# Patient Record
Sex: FEMALE | Race: WHITE
[De-identification: names, ages, dates, MRNs, and addresses within clinical notes are randomized per-mention and may not be internally consistent; named-entity substitution may affect disease eponyms.]

---
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
## 2020-01-23 ENCOUNTER — HOSPITAL ENCOUNTER (EMERGENCY)
Dept: HOSPITAL 61 - ER | Age: 20
Discharge: HOME | End: 2020-01-23
Payer: SELF-PAY

## 2020-01-23 VITALS — WEIGHT: 165.35 LBS | HEIGHT: 65 IN | BODY MASS INDEX: 27.55 KG/M2

## 2020-01-23 VITALS — SYSTOLIC BLOOD PRESSURE: 117 MMHG | DIASTOLIC BLOOD PRESSURE: 62 MMHG

## 2020-01-23 DIAGNOSIS — R10.2: ICD-10-CM

## 2020-01-23 DIAGNOSIS — N94.6: Primary | ICD-10-CM

## 2020-01-23 LAB
APTT PPP: YELLOW S
BACTERIA #/AREA URNS HPF: (no result) /HPF
BILIRUB UR QL STRIP: NEGATIVE
FIBRINOGEN PPP-MCNC: CLEAR MG/DL
NITRITE UR QL STRIP: NEGATIVE
PH UR STRIP: 6 [PH]
PROT UR STRIP-MCNC: NEGATIVE MG/DL
RBC #/AREA URNS HPF: 0 /HPF (ref 0–2)
SQUAMOUS #/AREA URNS LPF: (no result) /LPF
UROBILINOGEN UR-MCNC: 0.2 MG/DL
WBC #/AREA URNS HPF: 0 /HPF (ref 0–4)

## 2020-01-23 PROCEDURE — 81025 URINE PREGNANCY TEST: CPT

## 2020-01-23 PROCEDURE — 99285 EMERGENCY DEPT VISIT HI MDM: CPT

## 2020-01-23 PROCEDURE — 76856 US EXAM PELVIC COMPLETE: CPT

## 2020-01-23 PROCEDURE — 81001 URINALYSIS AUTO W/SCOPE: CPT

## 2020-01-23 NOTE — PHYS DOC
Past Medical History


Past Medical History:  No Pertinent History


Past Surgical History:  No Surgical History


Alcohol Use:  None





Adult General


Chief Complaint


Chief Complaint:  ABDOMINAL PAIN





HPI


HPI





Patient is a 19  year old female with no significant medical history who 

presents to the ED today complaining of 2 out of 10 pelvic pain bilaterally that

began 4 days ago. Patient reports the pain is cramping and intermittent. Denies 

any chance she is pregnant. She states she is currently on her menstrual cycle 

and is not bleeding more than normal. She reports she has been taking ibuprofen 

with relief. She reports she's never had abdominal cramping with her menstrual 

cycles.





Review of Systems


Review of Systems





Constitutional: Denies fever or chills []


Eyes: Denies change in visual acuity, redness, or eye pain []


HENT: Denies nasal congestion or sore throat []


Respiratory: Denies cough or shortness of breath []


Cardiovascular: No additional information not addressed in HPI []


GI: Reports abdominal cramping, denies nausea, vomiting, bloody stools or 

diarrhea []


: Denies dysuria or hematuria []


Musculoskeletal: Denies back pain or joint pain []


Integument: Denies rash or skin lesions []


Neurologic: Denies headache, focal weakness or sensory changes []








All other systems were reviewed and found to be within normal limits, except as 

documented in this note.





Allergies


Allergies





Allergies








Coded Allergies Type Severity Reaction Last Updated Verified


 


  No Known Drug Allergies    1/23/20 No











Physical Exam


Physical Exam





Constitutional: Well developed, well nourished, no acute distress, non-toxic 

appearance. []


HENT: Normocephalic, atraumatic, bilateral external ears normal, oropharynx 

moist, no oral exudates, nose normal. []


Eyes: PERRLA, EOMI, conjunctiva normal, no discharge. [] 


Neck: Normal range of motion, no tenderness, supple, no stridor. [] 


Cardiovascular:Heart rate regular rhythm, no murmur []


Lungs & Thorax:  Bilateral breath sounds clear to auscultation []


Abdomen: Bowel sounds normal, soft, no tenderness, no masses, no pulsatile 

masses. [] Refused pelvic exam.


Skin: Warm, dry, no erythema, no rash. [] 


Back: No tenderness, no CVA tenderness. [] 


Extremities: No tenderness, no cyanosis, no clubbing, ROM intact, no edema. [] 


Neurologic: Alert and oriented X 3, normal motor function, normal sensory 

function, no focal deficits noted. []


Psychologic: Affect normal, judgement normal, mood normal. []





Current Patient Data


Vital Signs





                                   Vital Signs








  Date Time  Temp Pulse Resp B/P (MAP) Pulse Ox O2 Delivery O2 Flow Rate FiO2


 


1/23/20 13:36 98.5 94 18 112/58 (76) 99 Room Air  





 98.5       








Lab Values





                                Laboratory Tests








Test


 1/23/20


13:28 1/23/20


13:31


 


Urine Collection Type Unknown   


 


Urine Color Yellow   


 


Urine Clarity Clear   


 


Urine pH 6.0   


 


Urine Specific Gravity <=1.005   


 


Urine Protein


 Negative mg/dL


(NEG-TRACE) 





 


Urine Glucose (UA)


 Negative mg/dL


(NEG) 





 


Urine Ketones (Stick)


 Negative mg/dL


(NEG) 





 


Urine Blood Large (NEG)   


 


Urine Nitrite


 Negative (NEG)


 





 


Urine Bilirubin


 Negative (NEG)


 





 


Urine Urobilinogen Dipstick


 0.2 mg/dL (0.2


mg/dL) 





 


Urine Leukocyte Esterase


 Negative (NEG)


 





 


Urine RBC 0 /HPF (0-2)   


 


Urine WBC 0 /HPF (0-4)   


 


Urine Squamous Epithelial


Cells Occ /LPF  


 





 


Urine Bacteria


 Few /HPF


(0-FEW) 





 


POC Urine HCG, Qualitative


 


 Hcg negative


(Negative)











EKG


EKG


[]





Radiology/Procedures


Radiology/Procedures


[]PROCEDURE: PELVIS COMPLETE





Examination: PELVIS COMPLETE


 


History: Pelvic pain


 


Comparison/Correlation: 3/28/2019 CT Abd and Pelvis with contrast


 


Findings: Transabdominal pelvic ultrasound exams performed.


 


Uterus measures 7.1 cm x 4.4 cm x 2.9 cm. Myometrium normal. Endometrial 


thickness is 0.5 cm.


 


Right ovary measures 2.7 cm x 1.8 cm x 1.5 cm. Left ovary measures 3 cm x 


2.2 cm x 1.8 cm. No adnexal masses.


Normal flow involving ovaries is identified.


 


Minimal pelvic free fluid is present. Urinary bladder is unremarkable.


 


Echogenic structure in the vaginal canal is present and probably 


represents a tampon.


 


 


Impression:


Minimal pelvic free fluid. No suspicious acute process.


 


Electronically signed by: Med Flowers MD (1/23/2020 2:49 PM) [facility]














DICTATED and SIGNED BY:     MED FLOWERS MD


DATE:     01/23/20 5871





Course & Med Decision Making


Course & Med Decision Making


Pertinent Labs and Imaging studies reviewed. (See chart for details)





This is a 19-year-old female patient presenting to the ED today complaining of 

pelvic pain for 4 days, she is currently on her menstrual cycle. Negative urine 

hCG, UA is negative, pelvic ultrasound is negative. Highly suspect menstrual 

cramps. Recommended ibuprofen. Discharged to home. Provided OB for follow-up.





Dragon Disclaimer


Dragon Disclaimer


This electronic medical record was generated, in whole or in part, using a voice

 recognition dictation system.





Departure


Departure


Impression:  


   Primary Impression:  


   Dysmenorrhea


Disposition:  01 HOME, SELF-CARE


Condition:  STABLE


Referrals:  


NO PCP (PCP)








SILVIA WHEAT MD


follow up in 1-2 weeks


Patient Instructions:  Dysmenorrhea, Easy-to-Read





Additional Instructions:  


You were evaluated in the emergency room for abdominal pain, we highly suspect 

to having menstrual cramps. Continue taking ibuprofen as. You can also take 

Tylenol as needed for pain. You can use a heating pad on  the abdomen. Follow-up

 with your OB/GYN in 1-2 weeks











MARY FIELD              Jan 23, 2020 15:25

## 2020-01-23 NOTE — RAD
Examination: PELVIS COMPLETE

 

History: Pelvic pain

 

Comparison/Correlation: 3/28/2019 CT Abd and Pelvis with contrast

 

Findings: Transabdominal pelvic ultrasound exams performed.

 

Uterus measures 7.1 cm x 4.4 cm x 2.9 cm. Myometrium normal. Endometrial 

thickness is 0.5 cm.

 

Right ovary measures 2.7 cm x 1.8 cm x 1.5 cm. Left ovary measures 3 cm x 

2.2 cm x 1.8 cm. No adnexal masses.

Normal flow involving ovaries is identified.

 

Minimal pelvic free fluid is present. Urinary bladder is unremarkable.

 

Echogenic structure in the vaginal canal is present and probably 

represents a tampon.

 

 

Impression:

Minimal pelvic free fluid. No suspicious acute process.

 

Electronically signed by: Kimani Mullen MD (1/23/2020 2:49 PM) [facility]

## 2020-04-03 ENCOUNTER — HOSPITAL ENCOUNTER (EMERGENCY)
Dept: HOSPITAL 96 - M.ERS | Age: 20
Discharge: HOME | End: 2020-04-03
Payer: COMMERCIAL

## 2020-04-03 VITALS — HEIGHT: 65 IN | WEIGHT: 155.01 LBS | BODY MASS INDEX: 25.83 KG/M2

## 2020-04-03 VITALS — SYSTOLIC BLOOD PRESSURE: 117 MMHG | DIASTOLIC BLOOD PRESSURE: 72 MMHG

## 2020-04-03 DIAGNOSIS — A59.01: Primary | ICD-10-CM

## 2020-04-03 LAB
BILIRUB UR-MCNC: NEGATIVE MG/DL
COLOR UR: YELLOW
KETONES UR STRIP-MCNC: NEGATIVE MG/DL
PROT UR QL STRIP: NEGATIVE
RBC # UR STRIP: (no result) /UL
RBC #/AREA URNS HPF: (no result) /HPF (ref 0–2)
SP GR UR STRIP: 1.01 (ref 1–1.03)
SQUAMOUS: (no result) /LPF (ref 0–3)
URINE CLARITY: CLEAR
URINE GLUCOSE-RANDOM: NEGATIVE
URINE LEUKOCYTES-REFLEX: (no result)
URINE NITRITE-REFLEX: NEGATIVE
URINE WBC-REFLEX: (no result) /HPF (ref 0–5)
UROBILINOGEN UR STRIP-ACNC: 0.2 E.U./DL (ref 0.2–1)

## 2020-06-11 ENCOUNTER — HOSPITAL ENCOUNTER (EMERGENCY)
Dept: HOSPITAL 96 - M.ERS | Age: 20
Discharge: HOME | End: 2020-06-11
Payer: COMMERCIAL

## 2020-06-11 VITALS — DIASTOLIC BLOOD PRESSURE: 75 MMHG | SYSTOLIC BLOOD PRESSURE: 120 MMHG

## 2020-06-11 VITALS — WEIGHT: 160.01 LBS | HEIGHT: 65 IN | BODY MASS INDEX: 26.66 KG/M2

## 2020-06-11 DIAGNOSIS — N76.0: Primary | ICD-10-CM

## 2020-06-11 LAB
BILIRUB UR-MCNC: NEGATIVE MG/DL
COLOR UR: YELLOW
KETONES UR STRIP-MCNC: NEGATIVE MG/DL
PROT UR QL STRIP: NEGATIVE
RBC # UR STRIP: NEGATIVE /UL
SP GR UR STRIP: 1.02 (ref 1–1.03)
SQUAMOUS: (no result) /LPF (ref 0–3)
URINE CLARITY: (no result)
URINE GLUCOSE-RANDOM: NEGATIVE
URINE LEUKOCYTES-REFLEX: (no result)
URINE NITRITE-REFLEX: NEGATIVE
URINE WBC-REFLEX: (no result) /HPF (ref 0–5)
UROBILINOGEN UR STRIP-ACNC: 0.2 E.U./DL (ref 0.2–1)

## 2020-08-11 ENCOUNTER — HOSPITAL ENCOUNTER (EMERGENCY)
Dept: HOSPITAL 96 - M.ERS | Age: 20
LOS: 1 days | Discharge: HOME | End: 2020-08-12
Payer: COMMERCIAL

## 2020-08-11 VITALS — HEIGHT: 65 IN | BODY MASS INDEX: 28.99 KG/M2 | WEIGHT: 174.01 LBS

## 2020-08-11 DIAGNOSIS — N39.0: ICD-10-CM

## 2020-08-11 DIAGNOSIS — N76.0: Primary | ICD-10-CM

## 2020-08-12 VITALS — SYSTOLIC BLOOD PRESSURE: 108 MMHG | DIASTOLIC BLOOD PRESSURE: 52 MMHG

## 2020-08-12 LAB
BILIRUB UR-MCNC: NEGATIVE MG/DL
COLOR UR: YELLOW
KETONES UR STRIP-MCNC: NEGATIVE MG/DL
PROT UR QL STRIP: NEGATIVE
RBC # UR STRIP: (no result) /UL
RBC #/AREA URNS HPF: (no result) /HPF (ref 0–2)
SP GR UR STRIP: 1.02 (ref 1–1.03)
SQUAMOUS: (no result) /LPF (ref 0–3)
URINE CLARITY: CLEAR
URINE GLUCOSE-RANDOM: NEGATIVE
URINE LEUKOCYTES-REFLEX: (no result)
URINE NITRITE-REFLEX: NEGATIVE
UROBILINOGEN UR STRIP-ACNC: 0.2 E.U./DL (ref 0.2–1)

## 2020-08-31 ENCOUNTER — HOSPITAL ENCOUNTER (EMERGENCY)
Dept: HOSPITAL 96 - M.ERS | Age: 20
Discharge: HOME | End: 2020-08-31
Payer: COMMERCIAL

## 2020-08-31 VITALS — WEIGHT: 180.01 LBS | HEIGHT: 65 IN | BODY MASS INDEX: 29.99 KG/M2

## 2020-08-31 VITALS — SYSTOLIC BLOOD PRESSURE: 128 MMHG | DIASTOLIC BLOOD PRESSURE: 58 MMHG

## 2020-08-31 DIAGNOSIS — S80.811A: ICD-10-CM
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
## 2020-08-31 DIAGNOSIS — S70.211A: Primary | ICD-10-CM

## 2020-08-31 DIAGNOSIS — M41.9: ICD-10-CM
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
## 2020-08-31 DIAGNOSIS — L03.115: ICD-10-CM

## 2020-09-05 ENCOUNTER — HOSPITAL ENCOUNTER (EMERGENCY)
Dept: HOSPITAL 96 - M.ERS | Age: 20
Discharge: HOME | End: 2020-09-05
Payer: COMMERCIAL

## 2020-09-05 VITALS — DIASTOLIC BLOOD PRESSURE: 64 MMHG | SYSTOLIC BLOOD PRESSURE: 114 MMHG

## 2020-09-05 VITALS — BODY MASS INDEX: 28.66 KG/M2 | WEIGHT: 172 LBS | HEIGHT: 65 IN

## 2020-09-05 DIAGNOSIS — S80.811A: ICD-10-CM
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
## 2020-09-05 DIAGNOSIS — S70.311A: Primary | ICD-10-CM
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
## 2020-09-05 DIAGNOSIS — M41.9: ICD-10-CM
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
## 2021-12-01 ENCOUNTER — HOSPITAL ENCOUNTER (EMERGENCY)
Dept: HOSPITAL 96 - M.ERS | Age: 21
LOS: 1 days | Discharge: HOME | End: 2021-12-02
Payer: COMMERCIAL

## 2021-12-01 VITALS — HEIGHT: 65 IN | BODY MASS INDEX: 28.32 KG/M2 | WEIGHT: 170 LBS

## 2021-12-01 DIAGNOSIS — R10.2: ICD-10-CM

## 2021-12-01 DIAGNOSIS — N76.0: Primary | ICD-10-CM

## 2021-12-01 LAB
BILIRUB UR-MCNC: NEGATIVE MG/DL
COLOR UR: YELLOW
KETONES UR STRIP-MCNC: NEGATIVE MG/DL
NITRITE UR QL STRIP: NEGATIVE
PROT UR QL STRIP: NEGATIVE
RBC # UR STRIP: (no result) /UL
SP GR UR STRIP: 1.02 (ref 1–1.03)
URINE CLARITY: CLEAR
URINE GLUCOSE-RANDOM: NEGATIVE
URINE LEUKOCYTES: NEGATIVE
UROBILINOGEN UR STRIP-ACNC: 2 E.U./DL (ref 0.2–1)

## 2021-12-02 VITALS — DIASTOLIC BLOOD PRESSURE: 67 MMHG | SYSTOLIC BLOOD PRESSURE: 122 MMHG

## 2021-12-02 LAB
ABSOLUTE BASOPHILS: 0.1 THOU/UL (ref 0–0.2)
ABSOLUTE EOSINOPHILS: 0.2 THOU/UL (ref 0–0.7)
ABSOLUTE MONOCYTES: 0.9 THOU/UL (ref 0–1.2)
ANION GAP SERPL CALC-SCNC: 9 MMOL/L (ref 7–16)
BASOPHILS NFR BLD AUTO: 1 %
BUN SERPL-MCNC: 17 MG/DL (ref 7–18)
CALCIUM SERPL-MCNC: 9 MG/DL (ref 8.5–10.1)
CHLORIDE SERPL-SCNC: 105 MMOL/L (ref 98–107)
CO2 SERPL-SCNC: 25 MMOL/L (ref 21–32)
CREAT SERPL-MCNC: 0.7 MG/DL (ref 0.6–1.3)
EOSINOPHIL NFR BLD: 2.2 %
GLUCOSE SERPL-MCNC: 83 MG/DL (ref 70–99)
GRANULOCYTES NFR BLD MANUAL: 55.7 %
HCT VFR BLD CALC: 42 % (ref 37–47)
HGB BLD-MCNC: 14.1 GM/DL (ref 12–15)
LYMPHOCYTES # BLD: 2.9 THOU/UL (ref 0.8–5.3)
LYMPHOCYTES NFR BLD AUTO: 31.7 %
MCH RBC QN AUTO: 30.5 PG (ref 26–34)
MCHC RBC AUTO-ENTMCNC: 33.5 G/DL (ref 28–37)
MCV RBC: 91.1 FL (ref 80–100)
MONOCYTES NFR BLD: 9.4 %
MPV: 9 FL. (ref 7.2–11.1)
NEUTROPHILS # BLD: 5.1 THOU/UL (ref 1.6–8.1)
NUCLEATED RBCS: 0 /100WBC
PLATELET COUNT*: 263 THOU/UL (ref 150–400)
POTASSIUM SERPL-SCNC: 3.9 MMOL/L (ref 3.5–5.1)
RBC # BLD AUTO: 4.61 MIL/UL (ref 4.2–5)
RDW-CV: 13.1 % (ref 10.5–14.5)
SODIUM SERPL-SCNC: 139 MMOL/L (ref 136–145)
WBC # BLD AUTO: 9.2 THOU/UL (ref 4–11)

## 2021-12-30 ENCOUNTER — HOSPITAL ENCOUNTER (EMERGENCY)
Dept: HOSPITAL 96 - M.ERS | Age: 21
Discharge: HOME | End: 2021-12-30
Payer: COMMERCIAL

## 2021-12-30 VITALS — HEIGHT: 65 IN | WEIGHT: 189.99 LBS | BODY MASS INDEX: 31.65 KG/M2

## 2021-12-30 VITALS — SYSTOLIC BLOOD PRESSURE: 130 MMHG | DIASTOLIC BLOOD PRESSURE: 69 MMHG

## 2021-12-30 DIAGNOSIS — F41.9: Primary | ICD-10-CM

## 2021-12-31 NOTE — EKG
[place] [address]
Phone:  [phone]                     ELECTROCARDIOGRAM REPORT      
_______________________________________________________________________________
 
Name:         KENNY DELEON          Room:                     [facility]#:    [id number]     Account #:     [id number]  
Admission:    21    Attend Phys:                     
Discharge:    21    Date of Birth: [birth year]  
Date of Service: 21 1306  Report #:      [id number]
        [id number]
_______________________________________________________________________________
THIS REPORT FOR:  //name//                      
 
                         [facility] ED
                                       
Test Date:    2021               Test Time:    13:06:27
Pat Name:     KENNY DELEON       Department:   
Patient ID:   [id number]            Room:          
Gender:                               Technician:   
:          2000               Requested By: Forrest Colin
Order Number: [id number] MD:   Freddy Price
                                 Measurements
Intervals                              Axis          
Rate:         82                       P:            25
WY:           120                      QRS:          54
QRSD:         91                       T:            43
QT:           353                                    
QTc:          413                                    
                           Interpretive Statements
Sinus rhythm
No previous ECG available for comparison
Electronically Signed On 2021 12:20:42 CST by Freddy Price
[email or web address]
 
 
 
 
 
 
 
 
 
 
 
 
 
 
 
 
 
 
 
 
 
 
  <ELECTRONICALLY SIGNED>
                                           By: Freddy Price MD, [facility]     
  21     1220
D: 21 1306   _____________________________________
T: 21 130   Freddy Price MD, FAC       /EPI